# Patient Record
Sex: FEMALE | Race: WHITE | NOT HISPANIC OR LATINO | Employment: FULL TIME | URBAN - METROPOLITAN AREA
[De-identification: names, ages, dates, MRNs, and addresses within clinical notes are randomized per-mention and may not be internally consistent; named-entity substitution may affect disease eponyms.]

---

## 2022-11-18 ENCOUNTER — OFFICE VISIT (OUTPATIENT)
Dept: URGENT CARE | Age: 47
End: 2022-11-18

## 2022-11-18 VITALS — RESPIRATION RATE: 18 BRPM | HEART RATE: 86 BPM | TEMPERATURE: 98.1 F | OXYGEN SATURATION: 99 %

## 2022-11-18 DIAGNOSIS — R09.81 NASAL CONGESTION: Primary | ICD-10-CM

## 2022-11-18 RX ORDER — OXYMETAZOLINE HYDROCHLORIDE 0.05 G/100ML
2 SPRAY NASAL 2 TIMES DAILY
Qty: 30 ML | Refills: 0 | Status: SHIPPED | OUTPATIENT
Start: 2022-11-18

## 2022-11-18 RX ORDER — FLUTICASONE PROPIONATE 50 MCG
1 SPRAY, SUSPENSION (ML) NASAL DAILY
Qty: 11.1 ML | Refills: 0 | Status: SHIPPED | OUTPATIENT
Start: 2022-11-18 | End: 2022-12-18

## 2022-11-18 RX ORDER — PSEUDOEPHEDRINE HCL 120 MG/1
120 TABLET, FILM COATED, EXTENDED RELEASE ORAL 2 TIMES DAILY
Qty: 14 TABLET | Refills: 0 | Status: SHIPPED | OUTPATIENT
Start: 2022-11-18 | End: 2022-11-25

## 2022-11-18 NOTE — PROGRESS NOTES
[facility]        NAME: Neil Hull is a 56 y o  female  : 1975    MRN: [id number]  DATE: 2022  TIME: 3:49 PM    Assessment and Plan   Nasal congestion [R09 81]  1  Nasal congestion  oxymetazoline (AFRIN) 0 05 % nasal spray    fluticasone (FLONASE) 50 mcg/act nasal spray    pseudoephedrine (SUDAFED) 120 MG 12 hr tablet        Patient presents with complaints of sinus congestion, pressure , inability to loosen mucous  Colored at times  Clears during the day  Denies fevers/chills  Assessment notes max sinus pressure, no adenopathy  Clear breath sounds  Discussed treating symptoms as viral/allergic at this time  F/U with PCP as needed    Patient Instructions       Follow up with PCP as needed    Chief Complaint     Chief Complaint   Patient presents with   • Nasal Congestion     Sinus congestion with green mucus for past week  No sore throat, fever, chills  Head pressure         History of Present Illness       Patient presents with complaints of sinus congestion, pressure , inability to loosen mucous  Colored at times  Clears during the day  Denies fevers/chills  Assessment notes max sinus pressure, no adenopathy  Clear breath sounds  Discussed treating symptoms as viral/allergic at this time  F/U with PCP as needed        Review of Systems   Review of Systems   Constitutional: Negative for chills and fever  HENT: Positive for congestion, postnasal drip and sinus pressure  Negative for ear pain, sinus pain and sore throat  Eyes: Negative for pain and itching  Respiratory: Negative for cough, shortness of breath and wheezing  Cardiovascular: Negative for chest pain and palpitations  Gastrointestinal: Negative for abdominal pain, constipation, diarrhea, nausea and vomiting  Genitourinary: Negative for difficulty urinating and hematuria  Musculoskeletal: Negative for arthralgias and myalgias  Skin: Negative for rash     Neurological: Negative for dizziness, light-headedness and headaches  Psychiatric/Behavioral: Negative for agitation and sleep disturbance  The patient is not nervous/anxious  Current Medications       Current Outpatient Medications:   •  fluticasone (FLONASE) 50 mcg/act nasal spray, 1 spray into each nostril daily, Disp: 11 1 mL, Rfl: 0  •  oxymetazoline (AFRIN) 0 05 % nasal spray, 2 sprays by Each Nare route 2 (two) times a day, Disp: 30 mL, Rfl: 0  •  pseudoephedrine (SUDAFED) 120 MG 12 hr tablet, Take 1 tablet (120 mg total) by mouth 2 (two) times a day for 7 days, Disp: 14 tablet, Rfl: 0  •  Ascorbic Acid, Vitamin C, (VITAMIN C) 100 MG tablet, Take 100 mg by mouth daily, Disp: , Rfl:   •  sertraline (ZOLOFT) 50 mg tablet, Take 50 mg by mouth daily, Disp: , Rfl:     Current Allergies     Allergies as of 11/18/2022 - never reviewed   Allergen Reaction Noted   • Amoxicillin Rash 07/08/2019            The following portions of the patient's history were reviewed and updated as appropriate: allergies, current medications, past family history, past medical history, past social history, past surgical history and problem list      No past medical history on file  No past surgical history on file  No family history on file  Medications have been verified  Objective   Pulse 86   Temp 98 1 °F (36 7 °C)   Resp 18   SpO2 99%   No LMP recorded  Physical Exam     Physical Exam  Vitals reviewed  Constitutional:       General: She is not in acute distress  Appearance: Normal appearance  She is not ill-appearing  HENT:      Head: Normocephalic and atraumatic  Nose: Congestion present  Right Turbinates: Swollen  Left Turbinates: Swollen  Right Sinus: Maxillary sinus tenderness present  Left Sinus: Maxillary sinus tenderness present  Mouth/Throat:      Pharynx: No posterior oropharyngeal erythema  Eyes:      Extraocular Movements: Extraocular movements intact        Conjunctiva/sclera: Conjunctivae normal    Cardiovascular:      Rate and Rhythm: Normal rate and regular rhythm  Pulses: Normal pulses  Heart sounds: Normal heart sounds  Pulmonary:      Effort: Pulmonary effort is normal       Breath sounds: Normal breath sounds  Lymphadenopathy:      Cervical: No cervical adenopathy  Skin:     General: Skin is warm  Neurological:      General: No focal deficit present  Mental Status: She is alert     Psychiatric:         Mood and Affect: Mood normal          Behavior: Behavior normal          Judgment: Judgment normal

## 2023-07-03 ENCOUNTER — OFFICE VISIT (OUTPATIENT)
Dept: URGENT CARE | Facility: CLINIC | Age: 48
End: 2023-07-03
Payer: COMMERCIAL

## 2023-07-03 VITALS
DIASTOLIC BLOOD PRESSURE: 62 MMHG | TEMPERATURE: 97.5 F | SYSTOLIC BLOOD PRESSURE: 125 MMHG | HEIGHT: 62 IN | RESPIRATION RATE: 14 BRPM | WEIGHT: 111 LBS | HEART RATE: 74 BPM | OXYGEN SATURATION: 100 % | BODY MASS INDEX: 20.43 KG/M2

## 2023-07-03 DIAGNOSIS — R19.7 DIARRHEA, UNSPECIFIED TYPE: Primary | ICD-10-CM

## 2023-07-03 PROCEDURE — 99213 OFFICE O/P EST LOW 20 MIN: CPT | Performed by: PHYSICIAN ASSISTANT

## 2023-07-03 NOTE — PATIENT INSTRUCTIONS
Patient was educated on hydration and trouble keeping food or drink down go directly to ED. Patient was told to follow up with PCP  Acute Diarrhea   AMBULATORY CARE:   Acute diarrhea  starts quickly and lasts a short time, usually 1 to 3 days. It can last up to 2 weeks. Signs and symptoms that may happen with diarrhea:  You may have 3 or more episodes of diarrhea. It may be hard to control your diarrhea. You may also have any of the following:  Fever and chills    Headache or abdominal pain    Nausea and vomiting    Symptoms of dehydration such as thirst, decreased urination, dry skin, sunken eyes, or fast, pounding heartbeat    Seek care immediately if:   You feel confused. Your heartbeat is faster than usual.     Your eyes look deeply sunken, or you have no tears when you cry. You urinate less than usual, or your urine is dark yellow. You have blood or mucus in your bowel movements. You have severe abdominal pain. You are unable to drink any liquids. Contact your healthcare provider if:  Your symptoms do not get better with treatment. You have a fever higher than 101.3°F (38.5°C). You have trouble eating and drinking because you are vomiting. Your diarrhea does not get better in 7 days. You have questions or concerns about your condition or care. Treatment for acute diarrhea  may include medicines to slow or stop your diarrhea. You may also need medicine to treat an infection. Self-care:   Drink liquids as directed. Liquids will help prevent dehydration caused by diarrhea. Ask your healthcare provider how much liquid to drink each day and which liquids are best for you. You may need to drink an oral rehydration solution (ORS). An ORS has the right amounts of water, salts, and sugar you need to replace body fluids. You can buy an ORS at most grocery stores and pharmacies.      Eat foods that are easy to digest.  Examples include rice, lentils, cereal, bananas, potatoes, and bread. It also includes some fruits (bananas, melon), well-cooked vegetables, and lean meats. Do not eat foods high in fiber, fat, and sugar. Also, do not drink alcohol until your diarrhea is gone. Prevent diarrhea:   Wash your hands often. Use soap and water. Wash your hands before you eat or prepare food. Also wash your hands after you use the bathroom. Use an alcohol-based hand gel when soap and water are not available. Keep bathroom surfaces clean. This helps prevent the spread of germs that cause acute diarrhea. Wash fruits and vegetables well before you eat them. This can help remove germs that cause diarrhea. If possible, remove the skin from fruits and vegetables, or cook them well before you eat them. Cook meat and poultry as directed. Meat includes beef and pork. Poultry includes chicken, turkey, and duck. Cook ground meat  to 160°F.     ONEOK, whole poultry, or cuts of poultry  to at least 165°F. Remove the poultry from heat. Let it stand for 3 minutes before you eat it. Cook whole cuts of meat other than poultry  to at least 145°F. Remove the meat from heat. Let it stand for 3 minutes before you eat it. Wash dishes that have touched raw meat or poultry with hot water and soap. This includes cutting boards, utensils, dishes, and serving containers. Place raw or cooked meat or poultry in the refrigerator as soon as possible. Bacteria can grow in meat that is left at room temperature too long. Do not eat raw or undercooked oysters, clams, or mussels. These foods may be contaminated and cause infection. Drink only filtered or treated water when you travel. Do not put ice in your drinks. Drink bottled water whenever possible. Follow up with your doctor as directed:  Write down your questions so you remember to ask them during your visits.   © Copyright Santhosh Castillo 2022 Information is for End User's use only and may not be sold, redistributed or otherwise used for commercial purposes. The above information is an  only. It is not intended as medical advice for individual conditions or treatments. Talk to your doctor, nurse or pharmacist before following any medical regimen to see if it is safe and effective for you.

## 2023-07-03 NOTE — PROGRESS NOTES
[facility]        NAME: Jose Chandra is a 53 y.o. female  : 1975    MRN: [id number]  DATE: July 3, 2023  TIME: 3:43 PM    Assessment and Plan   Diarrhea, unspecified type [R19.7]  1. Diarrhea, unspecified type              Patient Instructions     Patient was educated on hydration and trouble keeping food or drink down go directly to ED. Patient was told to follow up with PCP    Chief Complaint     Chief Complaint   Patient presents with   • Diarrhea     Pt states it started about 4 days ago. Pt states a lot of stress and having anxiety is not helping her not feel well. History of Present Illness       Patient is here today reporting diarrhea for four days. Patient reports every time she eats she has diarrhea. Denies any vomiting or abdomen pain. Admits allergy to Amoxicillin. Review of Systems   Review of Systems   Constitutional: Negative. Respiratory: Negative. Cardiovascular: Negative. Gastrointestinal: Positive for diarrhea. Negative for nausea and vomiting. Psychiatric/Behavioral: Negative.           Current Medications       Current Outpatient Medications:   •  Ascorbic Acid, Vitamin C, (VITAMIN C) 100 MG tablet, Take 100 mg by mouth daily (Patient not taking: Reported on 7/3/2023), Disp: , Rfl:   •  fluticasone (FLONASE) 50 mcg/act nasal spray, 1 spray into each nostril daily, Disp: 11.1 mL, Rfl: 0  •  oxymetazoline (AFRIN) 0.05 % nasal spray, 2 sprays by Each Nare route 2 (two) times a day (Patient not taking: Reported on 7/3/2023), Disp: 30 mL, Rfl: 0  •  pseudoephedrine (SUDAFED) 120 MG 12 hr tablet, Take 1 tablet (120 mg total) by mouth 2 (two) times a day for 7 days, Disp: 14 tablet, Rfl: 0  •  sertraline (ZOLOFT) 50 mg tablet, Take 50 mg by mouth daily (Patient not taking: Reported on 7/3/2023), Disp: , Rfl:     Current Allergies     Allergies as of 2023 - Reviewed 2023   Allergen Reaction Noted   • Amoxicillin Rash 2019 The following portions of the patient's history were reviewed and updated as appropriate: allergies, current medications, past family history, past medical history, past social history, past surgical history and problem list.     History reviewed. No pertinent past medical history. History reviewed. No pertinent surgical history. History reviewed. No pertinent family history. Medications have been verified. Objective   /62   Pulse 74   Temp 97.5 °F (36.4 °C) (Tympanic)   Resp 14   Ht 5' 2" (1.575 m)   Wt 50.3 kg (111 lb)   SpO2 100%   BMI 20.30 kg/m²   No LMP recorded. Physical Exam     Physical Exam  Vitals and nursing note reviewed. Constitutional:       Appearance: Normal appearance. HENT:      Head: Normocephalic. Cardiovascular:      Rate and Rhythm: Normal rate and regular rhythm. Heart sounds: Normal heart sounds. Pulmonary:      Breath sounds: Normal breath sounds. No wheezing. Abdominal:      General: Bowel sounds are normal.      Palpations: Abdomen is soft. Tenderness: There is no abdominal tenderness. Neurological:      General: No focal deficit present. Mental Status: She is alert and oriented to person, place, and time.    Psychiatric:         Mood and Affect: Mood normal.         Behavior: Behavior normal.

## 2023-08-14 ENCOUNTER — APPOINTMENT (OUTPATIENT)
Dept: RADIOLOGY | Facility: CLINIC | Age: 48
End: 2023-08-14
Payer: COMMERCIAL

## 2023-08-14 ENCOUNTER — OFFICE VISIT (OUTPATIENT)
Dept: URGENT CARE | Facility: CLINIC | Age: 48
End: 2023-08-14
Payer: COMMERCIAL

## 2023-08-14 VITALS
HEART RATE: 64 BPM | TEMPERATURE: 96.6 F | SYSTOLIC BLOOD PRESSURE: 117 MMHG | RESPIRATION RATE: 16 BRPM | OXYGEN SATURATION: 99 % | DIASTOLIC BLOOD PRESSURE: 61 MMHG

## 2023-08-14 DIAGNOSIS — R11.0 NAUSEA: ICD-10-CM

## 2023-08-14 DIAGNOSIS — R19.7 DIARRHEA, UNSPECIFIED TYPE: Primary | ICD-10-CM

## 2023-08-14 DIAGNOSIS — M54.50 LOW BACK PAIN, UNSPECIFIED BACK PAIN LATERALITY, UNSPECIFIED CHRONICITY, UNSPECIFIED WHETHER SCIATICA PRESENT: ICD-10-CM

## 2023-08-14 PROCEDURE — 99213 OFFICE O/P EST LOW 20 MIN: CPT | Performed by: PHYSICIAN ASSISTANT

## 2023-08-14 PROCEDURE — 72110 X-RAY EXAM L-2 SPINE 4/>VWS: CPT

## 2023-08-14 RX ORDER — ONDANSETRON 4 MG/1
4 TABLET, FILM COATED ORAL EVERY 8 HOURS PRN
Qty: 20 TABLET | Refills: 0 | Status: SHIPPED | OUTPATIENT
Start: 2023-08-14

## 2023-08-14 NOTE — PATIENT INSTRUCTIONS
Patient was educated on hydration. Patient was educated on Zofran to help with nausea. Patient was given a referral to gastro. Follow up with PCP. IF unable to eat or drink go to ED for further evaluation. Acute Diarrhea   AMBULATORY CARE:   Acute diarrhea  starts quickly and lasts a short time, usually 1 to 3 days. It can last up to 2 weeks. Signs and symptoms that may happen with diarrhea:  You may have 3 or more episodes of diarrhea. It may be hard to control your diarrhea. You may also have any of the following:  Fever and chills    Headache or abdominal pain    Nausea and vomiting    Symptoms of dehydration such as thirst, decreased urination, dry skin, sunken eyes, or fast, pounding heartbeat    Seek care immediately if:   You feel confused. Your heartbeat is faster than usual.     Your eyes look deeply sunken, or you have no tears when you cry. You urinate less than usual, or your urine is dark yellow. You have blood or mucus in your bowel movements. You have severe abdominal pain. You are unable to drink any liquids. Contact your healthcare provider if:  Your symptoms do not get better with treatment. You have a fever higher than 101.3°F (38.5°C). You have trouble eating and drinking because you are vomiting. Your diarrhea does not get better in 7 days. You have questions or concerns about your condition or care. Treatment for acute diarrhea  may include medicines to slow or stop your diarrhea. You may also need medicine to treat an infection. Self-care:   Drink liquids as directed. Liquids will help prevent dehydration caused by diarrhea. Ask your healthcare provider how much liquid to drink each day and which liquids are best for you. You may need to drink an oral rehydration solution (ORS). An ORS has the right amounts of water, salts, and sugar you need to replace body fluids. You can buy an ORS at most grocery stores and pharmacies.      Eat foods that are easy to digest.  Examples include rice, lentils, cereal, bananas, potatoes, and bread. It also includes some fruits (bananas, melon), well-cooked vegetables, and lean meats. Do not eat foods high in fiber, fat, and sugar. Also, do not drink alcohol until your diarrhea is gone. Prevent diarrhea:   Wash your hands often. Use soap and water. Wash your hands before you eat or prepare food. Also wash your hands after you use the bathroom. Use an alcohol-based hand gel when soap and water are not available. Keep bathroom surfaces clean. This helps prevent the spread of germs that cause acute diarrhea. Wash fruits and vegetables well before you eat them. This can help remove germs that cause diarrhea. If possible, remove the skin from fruits and vegetables, or cook them well before you eat them. Cook meat and poultry as directed. Meat includes beef and pork. Poultry includes chicken, turkey, and duck. Cook ground meat  to 160°F.     ONEOK, whole poultry, or cuts of poultry  to at least 165°F. Remove the poultry from heat. Let it stand for 3 minutes before you eat it. Cook whole cuts of meat other than poultry  to at least 145°F. Remove the meat from heat. Let it stand for 3 minutes before you eat it. Wash dishes that have touched raw meat or poultry with hot water and soap. This includes cutting boards, utensils, dishes, and serving containers. Place raw or cooked meat or poultry in the refrigerator as soon as possible. Bacteria can grow in meat that is left at room temperature too long. Do not eat raw or undercooked oysters, clams, or mussels. These foods may be contaminated and cause infection. Drink only filtered or treated water when you travel. Do not put ice in your drinks. Drink bottled water whenever possible. Follow up with your doctor as directed:  Write down your questions so you remember to ask them during your visits.   © Copyright [employer] 2022 Information is for End User's use only and may not be sold, redistributed or otherwise used for commercial purposes. The above information is an  only. It is not intended as medical advice for individual conditions or treatments. Talk to your doctor, nurse or pharmacist before following any medical regimen to see if it is safe and effective for you. and Vomiting   WHAT YOU NEED TO KNOW:   Acute means the nausea and vomiting starts suddenly, gets worse quickly, and lasts a short time. There are many possible causes of acute nausea and vomiting. DISCHARGE INSTRUCTIONS:   Call your local emergency number (911 in the [address]) if:   You have chest pain. You have severe pain or cramping in your abdomen. Your vision is blurred. You are confused, have a high fever, or a stiff neck. You have bright red blood coming from your rectum. Your vomit smells like bowel movement. Return to the emergency department if:   You have a severe headache or pain. You are dizzy, cold, and thirsty, and your eyes and mouth are dry. You are urinating very little or not at all. You are dizzy or lightheaded when you stand up. You see blood or material that looks like coffee grounds in your vomit. Call your doctor if:   You continue to vomit for more than 48 hours. Your nausea and vomiting does not get better or go away after you use medicine. You have questions or concerns about your condition or care. Medicines: You may need any of the following:  Medicines  may be given to calm your stomach and stop your vomiting. You may also need medicines to help empty your stomach and bowels. Take your medicine as directed. Contact your healthcare provider if you think your medicine is not helping or if you have side effects. Tell your provider if you are allergic to any medicine. Keep a list of the medicines, vitamins, and herbs you take.  Include the amounts, and when and why you take them. Bring the list or the pill bottles to follow-up visits. Carry your medicine list with you in case of an emergency. Manage your symptoms:   Rest as much as you can. Too much activity can make your nausea worse. Drink more liquids to prevent dehydration. Take small sips. Try drinks such as ginger ale, lemonade, water, or tea. Your provider may recommend that you drink an oral rehydration solution (ORS). ORS contains water, salts, and sugar that are needed to replace the lost body fluids. Eat smaller meals, more often. Try bland foods and avoid spices or strong flavors    Do not drink alcohol. Alcohol may upset or irritate your stomach. Follow up with your doctor as directed:  Write down your questions so you remember to ask them during your visits. © Copyright Tye Sandoval 2022 Information is for End User's use only and may not be sold, redistributed or otherwise used for commercial purposes. The above information is an  only. It is not intended as medical advice for individual conditions or treatments. Talk to your doctor, nurse or pharmacist before following any medical regimen to see if it is safe and effective for you.

## 2023-08-14 NOTE — PROGRESS NOTES
[facility]    [facility]        NAME: Aleyda Nelson is a 53 y.o. female  : 1975    MRN: [id number]  DATE: 2023  TIME: 2:12 PM    Assessment and Plan   Diarrhea, unspecified type [R19.7]  1. Diarrhea, unspecified type        2. Nausea  ondansetron (ZOFRAN) 4 mg tablet        Patient was told if she is unable to keep nutrients down she may need electrolytes. Patient was also educated she may need further testing to be evaluated for possible ulcer. Patient was told I'm happy to evaluate her back and order X-rays if warranted. Patient wanted to know if she could get a copy of the xray if she chose to do this and I told her she would have to call medical records but everything could be found in her mychart. Patient declined xray or evaluation at this time of her back  Patient Instructions       Patient was educated on hydration. Patient was educated on Zofran to help with nausea. Patient was given a referral to gastro. Follow up with PCP. IF unable to eat or drink go to ED for further evaluation. Chief Complaint     Chief Complaint   Patient presents with   • Diarrhea     Pt reports diarrhea x2 days. Was seen previously for diarrhea. History of Present Illness       Patient is here today complaining of diarrhea, abdomen pain and nausea for three days. Patient did have symptoms 1 month ago but never saw PCP. Patient reports every time she eats she gets diarrhea. Patient reports over the last month 5 lbs of weight loss. Admits allergy to Amoxicillin. Patient also reports back pain but at this time she said she does not want It further evaluated. Review of Systems   Review of Systems   Constitutional: Negative. HENT: Negative. Respiratory: Negative. Cardiovascular: Negative. Gastrointestinal: Positive for abdominal pain, diarrhea and nausea. Psychiatric/Behavioral: Negative.           Current Medications       Current Outpatient Medications:   • ondansetron (ZOFRAN) 4 mg tablet, Take 1 tablet (4 mg total) by mouth every 8 (eight) hours as needed for nausea or vomiting, Disp: 20 tablet, Rfl: 0  •  Ascorbic Acid, Vitamin C, (VITAMIN C) 100 MG tablet, Take 100 mg by mouth daily (Patient not taking: Reported on 7/3/2023), Disp: , Rfl:   •  fluticasone (FLONASE) 50 mcg/act nasal spray, 1 spray into each nostril daily, Disp: 11.1 mL, Rfl: 0  •  oxymetazoline (AFRIN) 0.05 % nasal spray, 2 sprays by Each Nare route 2 (two) times a day (Patient not taking: Reported on 7/3/2023), Disp: 30 mL, Rfl: 0  •  pseudoephedrine (SUDAFED) 120 MG 12 hr tablet, Take 1 tablet (120 mg total) by mouth 2 (two) times a day for 7 days, Disp: 14 tablet, Rfl: 0  •  sertraline (ZOLOFT) 50 mg tablet, Take 50 mg by mouth daily (Patient not taking: Reported on 7/3/2023), Disp: , Rfl:     Current Allergies     Allergies as of 08/14/2023 - Reviewed 08/14/2023   Allergen Reaction Noted   • Amoxicillin Rash 07/08/2019            The following portions of the patient's history were reviewed and updated as appropriate: allergies, current medications, past family history, past medical history, past social history, past surgical history and problem list.     History reviewed. No pertinent past medical history. No past surgical history on file. No family history on file. Medications have been verified. Objective   /61   Pulse 64   Temp (!) 96.6 °F (35.9 °C)   Resp 16   SpO2 99%   No LMP recorded. Physical Exam     Physical Exam  Vitals and nursing note reviewed. Constitutional:       Appearance: Normal appearance. HENT:      Head: Normocephalic. Cardiovascular:      Rate and Rhythm: Normal rate and regular rhythm. Heart sounds: Normal heart sounds. Pulmonary:      Breath sounds: Normal breath sounds. Abdominal:      General: Bowel sounds are normal.      Palpations: Abdomen is soft. Tenderness: There is no abdominal tenderness.  There is no right CVA tenderness or left CVA tenderness. Neurological:      Mental Status: She is alert.    Psychiatric:         Mood and Affect: Mood normal.         Behavior: Behavior normal.